# Patient Record
Sex: MALE | Race: WHITE | Employment: FULL TIME | URBAN - METROPOLITAN AREA
[De-identification: names, ages, dates, MRNs, and addresses within clinical notes are randomized per-mention and may not be internally consistent; named-entity substitution may affect disease eponyms.]

---

## 2017-04-19 ENCOUNTER — OFFICE VISIT (OUTPATIENT)
Dept: FAMILY MEDICINE | Facility: CLINIC | Age: 27
End: 2017-04-19
Payer: COMMERCIAL

## 2017-04-19 VITALS
SYSTOLIC BLOOD PRESSURE: 120 MMHG | TEMPERATURE: 97 F | WEIGHT: 220 LBS | DIASTOLIC BLOOD PRESSURE: 76 MMHG | HEART RATE: 80 BPM | BODY MASS INDEX: 29.84 KG/M2

## 2017-04-19 DIAGNOSIS — L23.7 CONTACT DERMATITIS DUE TO POISON IVY: Primary | ICD-10-CM

## 2017-04-19 PROCEDURE — 99213 OFFICE O/P EST LOW 20 MIN: CPT | Performed by: NURSE PRACTITIONER

## 2017-04-19 RX ORDER — PREDNISONE 20 MG/1
TABLET ORAL
Qty: 20 TABLET | Refills: 0 | Status: SHIPPED | OUTPATIENT
Start: 2017-04-19 | End: 2017-10-05

## 2017-04-19 NOTE — PROGRESS NOTES
SUBJECTIVE:                                                    Michael Dozier is a 26 year old male who presents to clinic today for the following health issues:      Rash     Onset: 3 days    Description:   Location: arms, waist, groin  Character: raised, red  Itching (Pruritis): YES    Progression of Symptoms:  same    Accompanying Signs & Symptoms:  Fever: no   Body aches or joint pain: no   Sore throat symptoms: no   Recent cold symptoms: no    History:     Previous similar rash: yes    Precipitating factors:   Exposure to similar rash: no   New exposures: He was turkey hunting, crawling around on the ground looking for a turkey he shot  Recent travel: no     Alleviating factors:  Cortisone for itch     Therapies Tried and outcome: baking soda/water, not helping          Problem list and histories reviewed & adjusted, as indicated.  Additional history: as documented    BP Readings from Last 3 Encounters:   04/19/17 120/76   08/24/14 128/69   12/13/13 122/78    Wt Readings from Last 3 Encounters:   04/19/17 220 lb (99.8 kg)   08/24/14 199 lb 1.6 oz (90.3 kg)   12/13/13 191 lb (86.6 kg)                    Reviewed and updated as needed this visit by clinical staff  Tobacco       Reviewed and updated as needed this visit by Provider         ROS:  Constitutional, HEENT, cardiovascular, pulmonary, gi and gu systems are negative, except as otherwise noted.    OBJECTIVE:                                                    /76  Pulse 80  Temp 97  F (36.1  C) (Tympanic)  Wt 220 lb (99.8 kg)  BMI 29.84 kg/m2  Body mass index is 29.84 kg/(m^2).  GENERAL: healthy, alert and no distress  SKIN: Erythematous papulovesicular rash in patches on the medial aspect of both forearms, wrists, around his waist, and in the groin         ASSESSMENT/PLAN:                                                      Problem List Items Addressed This Visit     None      Visit Diagnoses     Contact dermatitis due to poison ivy    -   Primary    Relevant Medications    predniSONE (DELTASONE) 20 MG tablet         Prednisone 60 mg 10 mg taper over 10 days  May use Benadryl for itching  I discussed topical products that may help with drying and relief of itching including oatmeal baths, Caladryl lotion, Rhule gel, Ivy dry, [place].    MARY LOU Rodriguez [facility]

## 2017-04-19 NOTE — MR AVS SNAPSHOT
"              After Visit Summary   2017    Michael Dozier    MRN: [id number]           Patient Information     Date Of Birth          1990        Visit Information        Provider Department      2017 9:00 AM Bia Bullard APRN CNP [facility]        Today's Diagnoses     Contact dermatitis due to poison ivy    -  1       Follow-ups after your visit        Who to contact     If you have questions or need follow up information about today's clinic visit or your schedule please contact [facility] directly at [phone].  Normal or non-critical lab and imaging results will be communicated to you by [facility] [employer] [facility], letter or phone within 4 business days after the clinic has received the results. If you do not hear from us within 7 days, please contact the clinic through [employer] or phone. If you have a critical or abnormal lab result, we will notify you by phone as soon as possible.  Submit refill requests through [employer] or call your pharmacy and they will forward the refill request to us. Please allow 3 business days for your refill to be completed.          Additional Information About Your Visit        MyChart Information     [employer] lets you send messages to your doctor, view your test results, renew your prescriptions, schedule appointments and more. To sign up, go to [email or web address] . Click on \"Log in\" on the left side of the screen, which will take you to the Welcome page. Then click on \"Sign up Now\" on the right side of the page.     You will be asked to enter the access code listed below, as well as some personal information. Please follow the directions to create your username and password.     Your access code is: [email or web address]  Expires: 2017 10:08 AM     Your access code will  in 90 days. If you need help or a new code, please call your [facility] or [phone].        Care EveryWhere ID     This is your Care EveryWhere ID. This " could be used by other organizations to access your [facility] medical records  [id number]        Your Vitals Were     Pulse Temperature BMI (Body Mass Index)             80 97  F (36.1  C) (Tympanic) 29.84 kg/m2          Blood Pressure from Last 3 Encounters:   04/19/17 120/76   08/24/14 128/69   12/13/13 122/78    Weight from Last 3 Encounters:   04/19/17 220 lb (99.8 kg)   08/24/14 199 lb 1.6 oz (90.3 kg)   12/13/13 191 lb (86.6 kg)              Today, you had the following     No orders found for display         Today's Medication Changes          These changes are accurate as of: 4/19/17 10:08 AM.  If you have any questions, ask your nurse or doctor.               Start taking these medicines.        Dose/Directions    predniSONE 20 MG tablet   Commonly known as:  DELTASONE   Used for:  Contact dermatitis due to poison ivy   Started by:  Bia Bullard APRN CNP        Take 3 tabs (60 mg) by mouth daily x 3 days, 2 tabs (40 mg) daily x 3 days, 1 tab (20 mg) daily x 3 days, then 1/2 tab (10 mg) x 3 days.   Quantity:  20 tablet   Refills:  0            Where to get your medicines      These medications were sent to [id number] - [place] - [address] [place] [address]  [address] [place] [id number]    Hours:  M-F 8:30-6:30; Sat 9-4; closed Sunday Phone:  [phone]     predniSONE 20 MG tablet                Primary Care Provider Office Phone # Fax #    Willy Thomas [phone]       [facility] [address] [place] [address]        Thank you!     Thank you for choosing [facility]  for your care. Our goal is always to provide you with excellent care. Hearing back from our patients is one way we can continue to improve our services. Please take a few minutes to complete the written survey that you may receive in the mail after your visit with us. Thank you!             Your Updated Medication List - Protect others around you: Learn how to safely  use, store and throw away your medicines at www.disposemymeds.org.          This list is accurate as of: 4/19/17 10:08 AM.  Always use your most recent med list.                   Brand Name Dispense Instructions for use    ibuprofen 200 MG tablet    ADVIL/MOTRIN     Take 3 tablets (600 mg) by mouth every 6 hours as needed for pain or fever       predniSONE 20 MG tablet    DELTASONE    20 tablet    Take 3 tabs (60 mg) by mouth daily x 3 days, 2 tabs (40 mg) daily x 3 days, 1 tab (20 mg) daily x 3 days, then 1/2 tab (10 mg) x 3 days.       TYLENOL PO

## 2017-04-21 ENCOUNTER — TELEPHONE (OUTPATIENT)
Dept: FAMILY MEDICINE | Facility: CLINIC | Age: 27
End: 2017-04-21

## 2017-04-21 NOTE — TELEPHONE ENCOUNTER
Reason for call:  Patient reporting a symptom    Symptom or request: Patient states he was seen Wed for poison ivy, it is continuing to spread, patient is requesting a call back to discuss.      Duration (how long have symptoms been present):     Have you been treated for this before? Yes    Additional comments:     Phone Number patient can be reached at:  Home number on file [phone] (home)    Best Time:      Can we leave a detailed message on this number:  YES    Call taken on 4/21/2017 at 7:12 AM by Yessica Hernandez

## 2017-04-21 NOTE — TELEPHONE ENCOUNTER
Called and spoke with patient, informed him per provider that he is to continue with Prednisone, over the counter meds, be sure clothes are washed in hot water and dried completely. Continue with over the counter meds. Patient stated understanding. Chance/MA

## 2017-10-05 ENCOUNTER — ALLIED HEALTH/NURSE VISIT (OUTPATIENT)
Dept: FAMILY MEDICINE | Facility: CLINIC | Age: 27
End: 2017-10-05
Payer: COMMERCIAL

## 2017-10-05 DIAGNOSIS — Z23 NEED FOR PROPHYLACTIC VACCINATION AND INOCULATION AGAINST INFLUENZA: Primary | ICD-10-CM

## 2017-10-05 PROCEDURE — 90471 IMMUNIZATION ADMIN: CPT

## 2017-10-05 PROCEDURE — 99207 ZZC NO CHARGE NURSE ONLY: CPT

## 2017-10-05 PROCEDURE — 90686 IIV4 VACC NO PRSV 0.5 ML IM: CPT

## 2017-10-05 NOTE — MR AVS SNAPSHOT
"              After Visit Summary   10/5/2017    Michael Dozier    MRN: [id number]           Patient Information     Date Of Birth          1990        Visit Information        Provider Department      10/5/2017 10:30 AM NL FLOAT TEAM D [facility]        Today's Diagnoses     Need for prophylactic vaccination and inoculation against influenza    -  1       Follow-ups after your visit        Your next 10 appointments already scheduled     Oct 05, 2017 10:30 AM CDT   Nurse Only with NL FLOAT TEAM D [facility] ([facility])    [address] [place] [id number] [phone]              Who to contact     If you have questions or need follow up information about today's clinic visit or your schedule please contact [facility] directly at [phone].  Normal or non-critical lab and imaging results will be communicated to you by MyChart, letter or phone within 4 business days after the clinic has received the results. If you do not hear from us within 7 days, please contact the clinic through MyChart or phone. If you have a critical or abnormal lab result, we will notify you by phone as soon as possible.  Submit refill requests through [employer] or call your pharmacy and they will forward the refill request to us. Please allow 3 business days for your refill to be completed.          Additional Information About Your Visit        MyChart Information     [employer] lets you send messages to your doctor, view your test results, renew your prescriptions, schedule appointments and more. To sign up, go to [email or web address] . Click on \"Log in\" on the left side of the screen, which will take you to the Welcome page. Then click on \"Sign up Now\" on the right side of the page.     You will be asked to enter the access code listed below, as well as some personal information. Please follow the directions to create your username and " password.     Your access code is: [email or web address]  Expires: 1/3/2018  9:29 AM     Your access code will  in 90 days. If you need help or a new code, please call your [facility] clinic or [phone].        Care EveryWhere ID     This is your Care EveryWhere ID. This could be used by other organizations to access your [facility] medical records  [id number]         Blood Pressure from Last 3 Encounters:   17 120/76   14 128/69   13 122/78    Weight from Last 3 Encounters:   17 220 lb (99.8 kg)   14 199 lb 1.6 oz (90.3 kg)   13 191 lb (86.6 kg)              We Performed the Following     FLU VAC, SPLIT VIRUS IM > 3 YO (QUADRIVALENT) [20801]     Vaccine Administration, Initial [20615]        Primary Care Provider Office Phone # Fax #    Willy Itz Thomas [phone] [address] [place] [address]        Equal Access to Services     [facility]: Hadii aad ku hadasho Soomaali, waaxda luqadaha, qaybta kaalmada laura, reyna crow. So [facility] [phone].    ATENCIÓN: Si habla español, tiene a christianson disposición servicios gratuitos de asistencia lingüística. [facility] [phone].    We comply with applicable federal civil rights laws and [place] laws. We do not discriminate on the basis of race, color, national origin, age, disability, sex, sexual orientation, or gender identity.            Thank you!     Thank you for choosing [facility]  for your care. Our goal is always to provide you with excellent care. Hearing back from our patients is one way we can continue to improve our services. Please take a few minutes to complete the written survey that you may receive in the mail after your visit with us. Thank you!             Your Updated Medication List - Protect others around you: Learn how to safely use, store and throw away your medicines at www.disposemymeds.org.          This list is accurate as of:  10/5/17  9:29 AM.  Always use your most recent med list.                   Brand Name Dispense Instructions for use Diagnosis    ibuprofen 200 MG tablet    ADVIL/MOTRIN     Take 3 tablets (600 mg) by mouth every 6 hours as needed for pain or fever        TYLENOL PO

## 2017-10-05 NOTE — NURSING NOTE
Prior to injection verified patient identity using patient's name and date of birth.   Patient instructed to remain in clinic for 20 minutes afterwards, and to report any adverse reaction to me immediately.  Micheline Paz MA

## 2017-10-05 NOTE — PROGRESS NOTES
Injectable Influenza Immunization Documentation    1.  Is the person to be vaccinated sick today?   No    2. Does the person to be vaccinated have an allergy to a component   of the vaccine?   No    3. Has the person to be vaccinated ever had a serious reaction   to influenza vaccine in the past?   No    4. Has the person to be vaccinated ever had Guillain-Barré syndrome?   No    Form completed by MP/MA

## 2018-08-07 ENCOUNTER — OFFICE VISIT (OUTPATIENT)
Dept: FAMILY MEDICINE | Facility: CLINIC | Age: 28
End: 2018-08-07
Payer: COMMERCIAL

## 2018-08-07 VITALS
TEMPERATURE: 97.9 F | BODY MASS INDEX: 31.25 KG/M2 | HEART RATE: 59 BPM | DIASTOLIC BLOOD PRESSURE: 80 MMHG | HEIGHT: 71 IN | SYSTOLIC BLOOD PRESSURE: 116 MMHG | OXYGEN SATURATION: 99 % | RESPIRATION RATE: 16 BRPM | WEIGHT: 223.25 LBS

## 2018-08-07 DIAGNOSIS — L23.7 CONTACT DERMATITIS DUE TO POISON IVY: ICD-10-CM

## 2018-08-07 DIAGNOSIS — B37.2 YEAST DERMATITIS: ICD-10-CM

## 2018-08-07 DIAGNOSIS — R21 RASH: Primary | ICD-10-CM

## 2018-08-07 PROCEDURE — 99213 OFFICE O/P EST LOW 20 MIN: CPT | Performed by: FAMILY MEDICINE

## 2018-08-07 RX ORDER — PREDNISONE 20 MG/1
60 TABLET ORAL DAILY
Qty: 45 TABLET | Refills: 0 | Status: SHIPPED | OUTPATIENT
Start: 2018-08-07 | End: 2019-01-14

## 2018-08-07 RX ORDER — CLOTRIMAZOLE AND BETAMETHASONE DIPROPIONATE 10; .64 MG/G; MG/G
CREAM TOPICAL 2 TIMES DAILY
Qty: 30 G | Refills: 1 | Status: SHIPPED | OUTPATIENT
Start: 2018-08-07 | End: 2019-01-14

## 2018-08-07 ASSESSMENT — PAIN SCALES - GENERAL: PAINLEVEL: NO PAIN (1)

## 2018-08-07 NOTE — MR AVS SNAPSHOT
"              After Visit Summary   8/7/2018    Michael Dozier    MRN: [id number]           Patient Information     Date Of Birth          1990        Visit Information        Provider Department      8/7/2018 10:20 AM Omar Covarrubias MD [facility]        Today's Diagnoses     Rash    -  1    Contact dermatitis due to poison ivy        Yeast dermatitis           Follow-ups after your visit        Who to contact     If you have questions or need follow up information about today's clinic visit or your schedule please contact [facility] directly at [phone].  Normal or non-critical lab and imaging results will be communicated to you by MyChart, letter or phone within 4 business days after the clinic has received the results. If you do not hear from us within 7 days, please contact the clinic through MyChart or phone. If you have a critical or abnormal lab result, we will notify you by phone as soon as possible.  Submit refill requests through [employer] or call your pharmacy and they will forward the refill request to us. Please allow 3 business days for your refill to be completed.          Additional Information About Your Visit        Care EveryWhere ID     This is your Care EveryWhere ID. This could be used by other organizations to access your [facility] medical records  [id number]        Your Vitals Were     Pulse Temperature Respirations Height Pulse Oximetry BMI (Body Mass Index)    59 97.9  F (36.6  C) (Tympanic) 16 5' 10.8\" (1.798 m) 99% 31.31 kg/m2       Blood Pressure from Last 3 Encounters:   08/07/18 116/80   04/19/17 120/76   08/24/14 128/69    Weight from Last 3 Encounters:   08/07/18 223 lb 4 oz (101.3 kg)   04/19/17 220 lb (99.8 kg)   08/24/14 199 lb 1.6 oz (90.3 kg)              Today, you had the following     No orders found for display         Today's Medication Changes          These changes are accurate as of 8/7/18  2:54 PM.  If you have any " questions, ask your nurse or doctor.               Start taking these medicines.        Dose/Directions    clotrimazole-betamethasone cream   Commonly known as:  LOTRISONE   Used for:  Rash   Started by:  Omar Covarrubias MD        Apply topically 2 times daily   Quantity:  30 g   Refills:  1       predniSONE 20 MG tablet   Commonly known as:  DELTASONE   Used for:  Rash, Contact dermatitis due to poison ivy   Started by:  Omar Covarrubias MD        Dose:  60 mg   Take 3 tablets (60 mg) by mouth daily Patient may repeat the 5 day course if reexposure   Quantity:  45 tablet   Refills:  0            Where to get your medicines      [facility] - [place], [place] - [address] [place] [address]  [address] [place] [address] [place] [address]    Hours:  M-F 8:30-6:30; Sat 9-4; closed Sunday Phone:  [phone]     clotrimazole-betamethasone cream    predniSONE 20 MG tablet                Primary Care Provider Office Phone # Fax #    Willy Itz Thomas [phone]        [address] [place] [address]        Equal Access to Services     [facility]: Hadii lora ku hadasho Soomaali, waaxda luqadaha, qaybta kaalmada adeegyada, reyna tong . So [facility] [phone].    ATENCIÓN: Si habla español, tiene a christianson disposición servicios gratuitos de asistencia lingüística. [facility] [phone].    We comply with applicable federal civil rights laws and [place] laws. We do not discriminate on the basis of race, color, national origin, age, disability, sex, sexual orientation, or gender identity.            Thank you!     Thank you for choosing [facility]  for your care. Our goal is always to provide you with excellent care. Hearing back from our patients is one way we can continue to improve our services. Please take a few minutes to complete the written survey that you may receive in the mail after your visit with us. Thank you!             Your  Updated Medication List - Protect others around you: Learn how to safely use, store and throw away your medicines at www.disposemymeds.org.          This list is accurate as of 8/7/18  2:54 PM.  Always use your most recent med list.                   Brand Name Dispense Instructions for use Diagnosis    clotrimazole-betamethasone cream    LOTRISONE    30 g    Apply topically 2 times daily    Rash       predniSONE 20 MG tablet    DELTASONE    45 tablet    Take 3 tablets (60 mg) by mouth daily Patient may repeat the 5 day course if reexposure    Rash, Contact dermatitis due to poison ivy

## 2018-08-07 NOTE — PROGRESS NOTES
"  SUBJECTIVE:   Michael Dozier is a 27 year old male who presents to clinic today for the following health issues:      Chief Complaint   Patient presents with     Poison Ivy     x4-5d groin area. Red, itchy.              Problem list and histories reviewed & adjusted, as indicated.  Additional history: Patient is here today with a rash on the underside of his scrotum.  He has been doing a lot of work and what he thought it might be poison ivy he has had the past but he states is not straining up like it usually does when he gets poison ivy.  No other complaints at this time.    Patient Active Problem List   Diagnosis     Sprain of wrist     Concussion     Postconcussion syndrome     GERD (gastroesophageal reflux disease)     Past Surgical History:   Procedure Laterality Date     HC CREATE EARDRUM OPENING,GEN ANESTH  1994,1995,1996    P.E. Tubes     HC REMOVAL ADENOIDS,PRIMARY,<11 Y/O  1995       Social History   Substance Use Topics     Smoking status: Never Smoker     Smokeless tobacco: Current User      Comment: 8/24/2014 1 tin every week     Alcohol use Yes      Comment: Once/week     Family History   Problem Relation Age of Onset     Lipids Paternal Grandmother          Current Outpatient Prescriptions   Medication Sig Dispense Refill     clotrimazole-betamethasone (LOTRISONE) cream Apply topically 2 times daily 30 g 1     predniSONE (DELTASONE) 20 MG tablet Take 3 tablets (60 mg) by mouth daily Patient may repeat the 5 day course if reexposure 45 tablet 0       Reviewed and updated as needed this visit by clinical staff  Tobacco  Allergies  Meds  Soc Hx      Reviewed and updated as needed this visit by Provider         ROS:  Constitutional, HEENT, cardiovascular, pulmonary, gi and gu systems are negative, except as otherwise noted.    OBJECTIVE:     /80  Pulse 59  Temp 97.9  F (36.6  C) (Tympanic)  Resp 16  Ht 5' 10.8\" (1.798 m)  Wt 223 lb 4 oz (101.3 kg)  SpO2 99%  BMI 31.31 kg/m2  Body " mass index is 31.31 kg/(m^2).   GENERAL: healthy, alert and no distress  SKIN: Patient has erythematous rash on the underside of the scrotum most consistent with yeast dermatitis.        ASSESSMENT:       PLAN:   1. Rash/yeast dermatitis  I believe to be stomatitis.  Will try the Lotrisone twice daily to see if it clears up.  -- clotrimazole-betamethasone (LOTRISONE) cream; Apply topically 2 times daily  Dispense: 30 g; Refill: 1    2. Contact dermatitis due to poison ivy  Patient has a history of severe reaction contact dermatitis poison ivy is doing a lot of work in the woods now getting ready for the upcoming year season so I will give him some prednisone so if he gets bad poison ivy he can start the medication on his own or not have to make an extra appointment.  - predniSONE (DELTASONE) 20 MG tablet; Take 3 tablets (60 mg) by mouth daily Patient may repeat the 5 day course if reexposure  Dispense: 45 tablet; Refill: 0                    Omar Covarrubias MD, MD  [facility]

## 2019-01-14 ENCOUNTER — OFFICE VISIT (OUTPATIENT)
Dept: FAMILY MEDICINE | Facility: CLINIC | Age: 29
End: 2019-01-14

## 2019-01-14 VITALS
DIASTOLIC BLOOD PRESSURE: 70 MMHG | OXYGEN SATURATION: 100 % | SYSTOLIC BLOOD PRESSURE: 122 MMHG | TEMPERATURE: 98.7 F | RESPIRATION RATE: 18 BRPM | WEIGHT: 225 LBS | HEIGHT: 70 IN | BODY MASS INDEX: 32.21 KG/M2 | HEART RATE: 104 BPM

## 2019-01-14 DIAGNOSIS — A08.4 VIRAL GASTROENTERITIS: Primary | ICD-10-CM

## 2019-01-14 PROCEDURE — 99213 OFFICE O/P EST LOW 20 MIN: CPT | Performed by: FAMILY MEDICINE

## 2019-01-14 ASSESSMENT — MIFFLIN-ST. JEOR: SCORE: 2006.84

## 2019-01-14 ASSESSMENT — PAIN SCALES - GENERAL: PAINLEVEL: NO PAIN (0)

## 2019-01-14 NOTE — PROGRESS NOTES
SUBJECTIVE:   Michael Dozier is a 28 year old male who presents to clinic today for the following health issues:      Diarrhea  Onset: wed 5 days.     Description:   Consistency of stool: watery and runny  Blood in stool: no   Number of loose stools in past 24 hours: 1    Progression of Symptoms:  improving    Accompanying Signs & Symptoms:  Fever: no   Nausea or vomiting; no   Abdominal pain: YES  Episodes of constipation: no   Weight loss: YES    History:   Ill contacts: no   Recent use of antibiotics: no    Recent travels: no          Recent medication-new or changes(Rx or OTC): None    Precipitating factors:   anactaid    Alleviating factors:   Water, not eating, IBU.     Therapies Tried and outcome:  ; Outcome:         Problem list and histories reviewed & adjusted, as indicated.  Additional history: as documented        Reviewed and updated as needed this visit by clinical staff  Tobacco  Allergies  Meds  Problems  Med Hx  Surg Hx  Fam Hx  Soc Hx        Reviewed and updated as needed this visit by Provider  Tobacco  Allergies  Meds  Problems  Med Hx  Surg Hx  Fam Hx         Symptoms reviewed as noted above.  Patient notes that he is actually improved somewhat today and has had only one bowel movement since waking up this morning.  He states that 5 days ago he went to a restaurant with his family and had seafood.  He does not recall any other food that he has had that may have caused him issues.  Patient has a history of Giardia but notes that his symptoms are not anything like this.    Patient has been able to stay relatively well hydrated.  He does well with late meals but anytime he increases his food intake, his diarrhea returns.  Patient had a hamburger last night that sparked a recurrence of symptoms.    Patient notes no hematochezia, melena, or hematemesis.    Patient notes that he does not have much consistency with his bowel movements when he is not ill.    ROS:  10 point ROS of  "systems including Constitutional, Eyes, HENT, Respiratory, Cardiovascular, Gastroenterology, Genitourinary, Integumentary, Muscularskeletal, Psychiatric were all negative except for pertinent positives noted in my HPI.     OBJECTIVE:   /70   Pulse 104   Temp 98.7  F (37.1  C) (Temporal)   Resp 18   Ht 1.778 m (5' 10\")   Wt 102.1 kg (225 lb)   SpO2 100%   BMI 32.28 kg/m    Body mass index is 32.28 kg/m .  Physical Exam   Constitutional: He appears well-developed and well-nourished.   Cardiovascular: Normal rate, regular rhythm, S1 normal, S2 normal and normal heart sounds.   No murmur heard.  Pulmonary/Chest: Effort normal and breath sounds normal. No respiratory distress. He has no wheezes. He has no rhonchi. He has no rales.   Abdominal: Soft. Bowel sounds are normal. There is no hepatosplenomegaly. There is no tenderness. There is no rigidity, no rebound, no guarding, no tenderness at McBurney's point and negative Swanson's sign. No hernia.   Neurological: He is alert.       ASSESSMENT/PLAN:       ICD-10-CM    1. Viral gastroenteritis A08.4      PLAN:  1.  Patient symptoms were reviewed and it is noted that he likely is recovering from a viral gastroenteritis.  We discussed supportive cares.  With regards to his stool habits outside of his recent illness, we discussed increasing fiber and water intake.  Recommend once daily fiber supplementation with either Citrucel, Fibercon, or metamucil, along with 3/4 gallon water or non caffeine fluid daily.     Follow up with Provider -as needed.    Willy Thomas MD   [facility]   "

## 2019-01-14 NOTE — PATIENT INSTRUCTIONS
Increase fiber and water intake.  I recommend once daily fiber supplementation with either Citrucel, Fibercon, or metamucil, along with 3/4 gallon water or non caffeine fluid daily.

## 2019-07-11 ENCOUNTER — TELEPHONE (OUTPATIENT)
Dept: FAMILY MEDICINE | Facility: CLINIC | Age: 29
End: 2019-07-11

## 2019-07-11 RX ORDER — PREDNISONE 20 MG/1
20 TABLET ORAL DAILY
Qty: 15 TABLET | Refills: 0 | COMMUNITY
Start: 2019-07-11

## 2019-07-11 NOTE — TELEPHONE ENCOUNTER
Ok per RF for Prednisone 20 mg. Take 3 tablets daily x 5 days. RX called to [facility] in [place]. Pt informed  Leah Pillai, CMA

## 2019-07-11 NOTE — TELEPHONE ENCOUNTER
Reason for Call:  Medication or medication refill:    Do you use a [facility]?  Name of the pharmacy and phone number for the current request:  [facility] - [phone]    Name of the medication requested: prednisone or what is prescribed for poison ivy     Other request: patient was seen by Dr Covarrubias back in August of 2018 and was told then that if he get's it again, to just call and have a message sent to him.  He doesn't really need to be seen for this and a script could be sent over.    Can we leave a detailed message on this number? YES    Phone number patient can be reached at: Cell number on file:    Telephone Information:   Mobile [phone]       Best Time:     Call taken on 7/11/2019 at 7:14 AM by aYdira French

## 2020-06-02 PROCEDURE — 99282 EMERGENCY DEPT VISIT SF MDM: CPT | Performed by: FAMILY MEDICINE

## 2020-06-02 PROCEDURE — 99284 EMERGENCY DEPT VISIT MOD MDM: CPT | Mod: Z6 | Performed by: FAMILY MEDICINE

## 2020-06-03 ENCOUNTER — HOSPITAL ENCOUNTER (EMERGENCY)
Facility: CLINIC | Age: 30
Discharge: HOME | End: 2020-06-03
Attending: FAMILY MEDICINE | Admitting: FAMILY MEDICINE
Payer: COMMERCIAL

## 2020-06-03 VITALS
HEART RATE: 83 BPM | SYSTOLIC BLOOD PRESSURE: 143 MMHG | OXYGEN SATURATION: 95 % | TEMPERATURE: 98.4 F | DIASTOLIC BLOOD PRESSURE: 84 MMHG | RESPIRATION RATE: 18 BRPM

## 2020-06-03 DIAGNOSIS — L55.9 SUNBURN: ICD-10-CM

## 2020-06-03 RX ORDER — CALAMINE
LOTION (ML) TOPICAL PRN
Refills: 0 | COMMUNITY
Start: 2020-06-03

## 2020-06-03 RX ORDER — CETIRIZINE HYDROCHLORIDE 10 MG/1
TABLET ORAL
Qty: 20 TABLET | Refills: 1 | COMMUNITY
Start: 2020-06-03

## 2020-06-03 RX ORDER — MELOXICAM 7.5 MG/1
7.5 TABLET ORAL 2 TIMES DAILY
Qty: 10 TABLET | Refills: 0 | Status: SHIPPED | OUTPATIENT
Start: 2020-06-03

## 2020-06-03 RX ORDER — ACETAMINOPHEN 500 MG
500-1000 TABLET ORAL EVERY 6 HOURS PRN
Refills: 0 | COMMUNITY
Start: 2020-06-03 | End: 2020-06-07

## 2020-06-03 ASSESSMENT — ENCOUNTER SYMPTOMS
CHILLS: 0
FEVER: 0
COLOR CHANGE: 1

## 2020-06-03 NOTE — ED AVS SNAPSHOT
[facility] Emergency Department  [address] [place] [address]  Phone:  [phone]  Fax:  [phone]                                    Michael Dozier   MRN: [id number]    Department:  [facility] Emergency Department   Date of Visit:  6/2/2020           After Visit Summary Signature Page    I have received my discharge instructions, and my questions have been answered. I have discussed any challenges I see with this plan with the nurse or doctor.    ..........................................................................................................................................  Patient/Patient Representative Signature      ..........................................................................................................................................  Patient Representative Print Name and Relationship to Patient    ..................................................               ................................................  Date                                   Time    ..........................................................................................................................................  Reviewed by Signature/Title    ...................................................              ..............................................  Date                                               Time          22EPIC Rev 08/18

## 2020-06-03 NOTE — ED TRIAGE NOTES
Pt reports having sunburn over the past couple of days, all day today he has had itching of the upper arms and back and getting worse as day has gone on with inability to sleep at this time, took prednisone and zyrtec at home

## 2020-06-03 NOTE — ED PROVIDER NOTES
History     Chief Complaint   Patient presents with     Pruritis     HPI  Michael Dozier is a 29 year old male who presents to the ER with concerns about burning pain associated with sunburn that occurred Sunday this last week.  He mitts to being sunburn across his upper back and upper arms bilaterally.  He stated that he started a dose of prednisone and Zyrtec last night secondary to the burning sensation associated with the sunburn area.  He has had no blistering and feels like the sunburn is not as bad as he has had in the past but it is much more painful than typical.  He denies any fever or chill symptoms.      Allergies:  Allergies   Allergen Reactions     No Known Drug Allergies        Problem List:    Patient Active Problem List    Diagnosis Date Noted     GERD (gastroesophageal reflux disease) 06/27/2011     Priority: Medium     Concussion 11/20/2007     Priority: Medium     Problem list name updated by automated process. Provider to review       Postconcussion syndrome 11/20/2007     Priority: Medium     Sprain of wrist 07/16/2004     Priority: Medium     Problem list name updated by automated process. Provider to review          Past Medical History:    Past Medical History:   Diagnosis Date     GERD (gastroesophageal reflux disease) 6/27/2011       Past Surgical History:    Past Surgical History:   Procedure Laterality Date     HC CREATE EARDRUM OPENING,GEN ANESTH  1994,1995,1996    P.E. Tubes     HC REMOVAL ADENOIDS,PRIMARY,<11 Y/O  1995       Family History:    Family History   Problem Relation Age of Onset     Lipids Paternal Grandmother        Social History:  Marital Status:   [2]  Social History     Tobacco Use     Smoking status: Never Smoker     Smokeless tobacco: Former User   Substance Use Topics     Alcohol use: Yes     Comment: Once/week     Drug use: No        Medications:    acetaminophen (TYLENOL) 500 MG tablet  Calamine external lotion  cetirizine (ZYRTEC) 10 MG  tablet  meloxicam (MOBIC) 7.5 MG tablet  predniSONE (DELTASONE) 20 MG tablet          Review of Systems   Constitutional: Negative for chills and fever.   Skin: Positive for color change and rash (Sunburn skin to the upper arms anterior neck and upper back).   All other systems reviewed and are negative.      Physical Exam   BP: (!) 143/84  Pulse: 83  Temp: 98.4  F (36.9  C)  Resp: 18  SpO2: 95 %      Physical Exam  Vitals signs and nursing note reviewed.   Constitutional:       Appearance: Normal appearance. He is obese.   HENT:      Head: Normocephalic and atraumatic.      Mouth/Throat:      Mouth: Mucous membranes are moist.   Eyes:      Extraocular Movements: Extraocular movements intact.      Conjunctiva/sclera: Conjunctivae normal.      Pupils: Pupils are equal, round, and reactive to light.   Neck:      Musculoskeletal: Normal range of motion and neck supple.   Cardiovascular:      Rate and Rhythm: Normal rate.   Pulmonary:      Effort: Pulmonary effort is normal. No respiratory distress.   Musculoskeletal: Normal range of motion.   Skin:     Findings: Burn present. Rash is not crusting, nodular, purpuric, urticarial or vesicular.          Neurological:      Mental Status: He is alert.         ED Course        Procedures               Critical Care time:  none               Assessments & Plan (with Medical Decision Making)  Patient with evidence for moderate sunburn to the upper back, anterior neck, shoulders, and upper arms.  No open wound or blistering identified.  No secondary abscess or infection identified.  Patient offered different options for treatment but we did not recommend continuing oral prednisone.  Medication prescribed as listed below.  He can continue the cool wet compresses to the area as needed.     I have reviewed the nursing notes.    I have reviewed the findings, diagnosis, plan and need for follow up with the patient.       Discharge Medication List as of 6/3/2020 12:56 AM      START  taking these medications    Details   acetaminophen (TYLENOL) 500 MG tablet Take 1-2 tablets (500-1,000 mg) by mouth every 6 hours as needed, R-0, OTC      Calamine external lotion Apply topically as needed for itchingR-0OTC      cetirizine (ZYRTEC) 10 MG tablet 1 tab up to twice a day for itch/rash, Disp-20 tablet,R-1, OTC      meloxicam (MOBIC) 7.5 MG tablet Take 1 tablet (7.5 mg) by mouth 2 times daily, Disp-10 tablet,R-0, E-Prescribe                  I verbally discussed the findings of the evaluation today in the ER. I have verbally discussed with Michael the suggested treatment(s) as described in the discharge instructions and handouts. I have prescribed the above listed medications and instructed him on appropriate use of these medications.      I have verbally suggested he follow-up in his clinic or return to the ER for increased symptoms. See the follow-up recommendations documented  in the after visit summary in this visit's EPIC chart.    Final diagnoses:   Sunburn       6/2/2020   [facility] EMERGENCY DEPARTMENT     Parish Rocha,   06/03/20 0418